# Patient Record
Sex: MALE
[De-identification: names, ages, dates, MRNs, and addresses within clinical notes are randomized per-mention and may not be internally consistent; named-entity substitution may affect disease eponyms.]

---

## 2019-03-12 ENCOUNTER — HOSPITAL ENCOUNTER (OUTPATIENT)
Dept: HOSPITAL 95 - ORSCSDS | Age: 69
Discharge: HOME | End: 2019-03-12
Attending: INTERNAL MEDICINE
Payer: MEDICARE

## 2019-03-12 VITALS — BODY MASS INDEX: 36.56 KG/M2 | HEIGHT: 75 IN | WEIGHT: 294.01 LBS

## 2019-03-12 DIAGNOSIS — G47.33: ICD-10-CM

## 2019-03-12 DIAGNOSIS — D12.2: ICD-10-CM

## 2019-03-12 DIAGNOSIS — I10: ICD-10-CM

## 2019-03-12 DIAGNOSIS — Z12.11: Primary | ICD-10-CM

## 2019-03-12 DIAGNOSIS — E66.01: ICD-10-CM

## 2019-03-12 DIAGNOSIS — K64.8: ICD-10-CM

## 2019-03-12 DIAGNOSIS — K63.5: ICD-10-CM

## 2019-03-12 DIAGNOSIS — D12.3: ICD-10-CM

## 2019-03-12 PROCEDURE — 0DBL8ZX EXCISION OF TRANSVERSE COLON, VIA NATURAL OR ARTIFICIAL OPENING ENDOSCOPIC, DIAGNOSTIC: ICD-10-PCS | Performed by: INTERNAL MEDICINE

## 2019-03-12 PROCEDURE — 0DBN8ZX EXCISION OF SIGMOID COLON, VIA NATURAL OR ARTIFICIAL OPENING ENDOSCOPIC, DIAGNOSTIC: ICD-10-PCS | Performed by: INTERNAL MEDICINE

## 2019-03-12 PROCEDURE — 0DBK8ZX EXCISION OF ASCENDING COLON, VIA NATURAL OR ARTIFICIAL OPENING ENDOSCOPIC, DIAGNOSTIC: ICD-10-PCS | Performed by: INTERNAL MEDICINE

## 2019-03-12 NOTE — NUR
03/12/19 1151 Aster Spence
UPON ARRIVING TO STEP DOWN BP WAS 85/41 AT 1141. NEW CUFF WAS PLACED
ON THE OPPOSITE ARM AND BP WAS RETAKEN. AT 1145 BP WAS 84/47. DR. ULLOA NOTIFIED; NO INTERVENTIONS AT THAT TIME. RN CONTINUED TO
MONITOR BP. BP IS /58. PT IS AWAKE AND ORIENTED. PT IS
TOLERATING PO FLUIDS NOW. PT DENIES NAUSEA AND PAIN. FLUIDS ARE
RUNNING PER DR. ULLOA.

## 2023-07-02 ENCOUNTER — HOSPITAL ENCOUNTER (EMERGENCY)
Dept: HOSPITAL 95 - ER | Age: 73
Discharge: HOME | End: 2023-07-02
Payer: MEDICARE

## 2023-07-02 VITALS — BODY MASS INDEX: 36.06 KG/M2 | WEIGHT: 290 LBS | HEIGHT: 75 IN

## 2023-07-02 VITALS — SYSTOLIC BLOOD PRESSURE: 178 MMHG | DIASTOLIC BLOOD PRESSURE: 80 MMHG

## 2023-07-02 DIAGNOSIS — Z79.899: ICD-10-CM

## 2023-07-02 DIAGNOSIS — S00.83XA: Primary | ICD-10-CM
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
